# Patient Record
Sex: MALE | Race: WHITE | NOT HISPANIC OR LATINO
[De-identification: names, ages, dates, MRNs, and addresses within clinical notes are randomized per-mention and may not be internally consistent; named-entity substitution may affect disease eponyms.]

---

## 2023-05-25 PROBLEM — Z00.00 ENCOUNTER FOR PREVENTIVE HEALTH EXAMINATION: Status: ACTIVE | Noted: 2023-05-25

## 2023-05-30 ENCOUNTER — APPOINTMENT (OUTPATIENT)
Dept: PULMONOLOGY | Facility: CLINIC | Age: 73
End: 2023-05-30
Payer: MEDICARE

## 2023-05-30 VITALS
SYSTOLIC BLOOD PRESSURE: 100 MMHG | BODY MASS INDEX: 20.32 KG/M2 | DIASTOLIC BLOOD PRESSURE: 60 MMHG | OXYGEN SATURATION: 98 % | HEIGHT: 72 IN | HEART RATE: 47 BPM | WEIGHT: 150 LBS

## 2023-05-30 DIAGNOSIS — R41.3 OTHER AMNESIA: ICD-10-CM

## 2023-05-30 DIAGNOSIS — R06.83 SNORING: ICD-10-CM

## 2023-05-30 PROCEDURE — 99204 OFFICE O/P NEW MOD 45 MIN: CPT

## 2023-05-30 NOTE — ASSESSMENT
[FreeTextEntry1] :   Patient with a history of memory loss and loud snoring when supine.  I feel this patient should have a home sleep study to rule out sleep apnea syndrome contributing to neurologic dysfunction.  Of note the patient seems alert and oriented at this time.  We will order a home sleep study.

## 2023-05-30 NOTE — HISTORY OF PRESENT ILLNESS
[FreeTextEntry1] : Evaluation for possible sleep apnea. [de-identified] :   73-year-old male who is being evaluated for memory loss in a special program.  He has been recommended to have a sleep study to rule out sleep apnea as the cause of memory impairment.  Patient sleeps from 10 PM to 6:30 AM.  He wakes up usually once or twice per night to urinate but goes back to sleep easily.  He is known to have loud snoring when he lies supine.  There is no witnessed apneas.  He feels rested on awakening in the morning with no unusual daytime sleepiness.  His [place] score is 4.  He exercises quite a bit.  He is 6 feet tall weight 150.  Past medical history hyperlipidemia and a leaky aortic valve.  He is on no medications.  He is a non-smoker nondrinker.